# Patient Record
Sex: MALE | Race: WHITE | NOT HISPANIC OR LATINO | URBAN - METROPOLITAN AREA
[De-identification: names, ages, dates, MRNs, and addresses within clinical notes are randomized per-mention and may not be internally consistent; named-entity substitution may affect disease eponyms.]

---

## 2021-03-19 ENCOUNTER — OFFICE VISIT (OUTPATIENT)
Dept: URBAN - METROPOLITAN AREA CLINIC 94 | Facility: CLINIC | Age: 41
End: 2021-03-19
Payer: MEDICAID

## 2021-03-19 ENCOUNTER — LAB OUTSIDE AN ENCOUNTER (OUTPATIENT)
Dept: URBAN - METROPOLITAN AREA CLINIC 94 | Facility: CLINIC | Age: 41
End: 2021-03-19

## 2021-03-19 ENCOUNTER — DASHBOARD ENCOUNTERS (OUTPATIENT)
Age: 41
End: 2021-03-19

## 2021-03-19 VITALS
BODY MASS INDEX: 21.07 KG/M2 | DIASTOLIC BLOOD PRESSURE: 97 MMHG | HEART RATE: 76 BPM | TEMPERATURE: 97.9 F | HEIGHT: 70 IN | WEIGHT: 147.2 LBS | SYSTOLIC BLOOD PRESSURE: 148 MMHG

## 2021-03-19 DIAGNOSIS — R10.12 LUQ PAIN: ICD-10-CM

## 2021-03-19 DIAGNOSIS — R10.13 EPIGASTRIC PAIN: ICD-10-CM

## 2021-03-19 DIAGNOSIS — D18.03 HEMANGIOMA OF LIVER: ICD-10-CM

## 2021-03-19 PROCEDURE — 99204 OFFICE O/P NEW MOD 45 MIN: CPT | Performed by: NURSE PRACTITIONER

## 2021-03-19 PROCEDURE — 99244 OFF/OP CNSLTJ NEW/EST MOD 40: CPT | Performed by: NURSE PRACTITIONER

## 2021-03-19 NOTE — PHYSICAL EXAM CONSTITUTIONAL:
thin well developed, well nourished , in no acute distress , ambulating without difficulty , normal communication ability
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
## 2021-03-19 NOTE — PHYSICAL EXAM GASTROINTESTINAL
Abdomen , soft, epigastric tenderness, nondistended , no guarding or rigidity , no masses palpable , normal bowel sounds , skin graft scarring on RLQ. Liver and Spleen , no hepatomegaly present , no hepatosplenomegaly , liver nontender , spleen not palpable

## 2021-04-09 ENCOUNTER — OFFICE VISIT (OUTPATIENT)
Dept: URBAN - METROPOLITAN AREA SURGERY CENTER 17 | Facility: SURGERY CENTER | Age: 41
End: 2021-04-09